# Patient Record
Sex: FEMALE | Race: OTHER | NOT HISPANIC OR LATINO
[De-identification: names, ages, dates, MRNs, and addresses within clinical notes are randomized per-mention and may not be internally consistent; named-entity substitution may affect disease eponyms.]

---

## 2017-02-02 ENCOUNTER — RESULT REVIEW (OUTPATIENT)
Age: 62
End: 2017-02-02

## 2019-03-21 ENCOUNTER — EMERGENCY (EMERGENCY)
Facility: HOSPITAL | Age: 64
LOS: 0 days | Discharge: HOME | End: 2019-03-21
Attending: EMERGENCY MEDICINE
Payer: COMMERCIAL

## 2019-03-21 VITALS
DIASTOLIC BLOOD PRESSURE: 84 MMHG | HEART RATE: 83 BPM | OXYGEN SATURATION: 100 % | RESPIRATION RATE: 17 BRPM | TEMPERATURE: 97 F | SYSTOLIC BLOOD PRESSURE: 145 MMHG | HEIGHT: 62 IN | WEIGHT: 121.03 LBS

## 2019-03-21 VITALS — DIASTOLIC BLOOD PRESSURE: 76 MMHG | SYSTOLIC BLOOD PRESSURE: 133 MMHG | TEMPERATURE: 98 F

## 2019-03-21 LAB
ALBUMIN SERPL ELPH-MCNC: 3.9 G/DL — SIGNIFICANT CHANGE UP (ref 3.3–5)
ALP SERPL-CCNC: 65 U/L — SIGNIFICANT CHANGE UP (ref 40–120)
ALT FLD-CCNC: 15 U/L — SIGNIFICANT CHANGE UP (ref 12–78)
ANION GAP SERPL CALC-SCNC: 5 MMOL/L — SIGNIFICANT CHANGE UP (ref 5–17)
AST SERPL-CCNC: 27 U/L — SIGNIFICANT CHANGE UP (ref 15–37)
BASOPHILS # BLD AUTO: 0 K/UL — SIGNIFICANT CHANGE UP (ref 0–0.2)
BASOPHILS NFR BLD AUTO: 0 % — SIGNIFICANT CHANGE UP (ref 0–2)
BILIRUB SERPL-MCNC: 0.5 MG/DL — SIGNIFICANT CHANGE UP (ref 0.2–1.2)
BUN SERPL-MCNC: 9 MG/DL — SIGNIFICANT CHANGE UP (ref 7–23)
CALCIUM SERPL-MCNC: 8.5 MG/DL — SIGNIFICANT CHANGE UP (ref 8.5–10.1)
CHLORIDE SERPL-SCNC: 101 MMOL/L — SIGNIFICANT CHANGE UP (ref 96–108)
CO2 SERPL-SCNC: 28 MMOL/L — SIGNIFICANT CHANGE UP (ref 22–31)
CREAT SERPL-MCNC: 0.61 MG/DL — SIGNIFICANT CHANGE UP (ref 0.5–1.3)
EOSINOPHIL # BLD AUTO: 0 K/UL — SIGNIFICANT CHANGE UP (ref 0–0.5)
EOSINOPHIL NFR BLD AUTO: 0 % — SIGNIFICANT CHANGE UP (ref 0–6)
GLUCOSE SERPL-MCNC: 105 MG/DL — HIGH (ref 70–99)
HCT VFR BLD CALC: 38.8 % — SIGNIFICANT CHANGE UP (ref 34.5–45)
HGB BLD-MCNC: 13.1 G/DL — SIGNIFICANT CHANGE UP (ref 11.5–15.5)
LYMPHOCYTES # BLD AUTO: 0.89 K/UL — LOW (ref 1–3.3)
LYMPHOCYTES # BLD AUTO: 26 % — SIGNIFICANT CHANGE UP (ref 13–44)
MAGNESIUM SERPL-MCNC: 2.3 MG/DL — SIGNIFICANT CHANGE UP (ref 1.6–2.6)
MANUAL SMEAR VERIFICATION: SIGNIFICANT CHANGE UP
MCHC RBC-ENTMCNC: 29 PG — SIGNIFICANT CHANGE UP (ref 27–34)
MCHC RBC-ENTMCNC: 33.8 GM/DL — SIGNIFICANT CHANGE UP (ref 32–36)
MCV RBC AUTO: 85.8 FL — SIGNIFICANT CHANGE UP (ref 80–100)
MONOCYTES # BLD AUTO: 0.27 K/UL — SIGNIFICANT CHANGE UP (ref 0–0.9)
MONOCYTES NFR BLD AUTO: 8 % — SIGNIFICANT CHANGE UP (ref 2–14)
NEUTROPHILS # BLD AUTO: 2.26 K/UL — SIGNIFICANT CHANGE UP (ref 1.8–7.4)
NEUTROPHILS NFR BLD AUTO: 66 % — SIGNIFICANT CHANGE UP (ref 43–77)
NRBC # BLD: 0 /100 — SIGNIFICANT CHANGE UP (ref 0–0)
NRBC # BLD: SIGNIFICANT CHANGE UP /100 WBCS (ref 0–0)
PLAT MORPH BLD: NORMAL — SIGNIFICANT CHANGE UP
PLATELET # BLD AUTO: 208 K/UL — SIGNIFICANT CHANGE UP (ref 150–400)
POTASSIUM SERPL-MCNC: 4.3 MMOL/L — SIGNIFICANT CHANGE UP (ref 3.5–5.3)
POTASSIUM SERPL-SCNC: 4.3 MMOL/L — SIGNIFICANT CHANGE UP (ref 3.5–5.3)
PROT SERPL-MCNC: 7.2 GM/DL — SIGNIFICANT CHANGE UP (ref 6–8.3)
RBC # BLD: 4.52 M/UL — SIGNIFICANT CHANGE UP (ref 3.8–5.2)
RBC # FLD: 13.3 % — SIGNIFICANT CHANGE UP (ref 10.3–14.5)
RBC BLD AUTO: NORMAL — SIGNIFICANT CHANGE UP
SODIUM SERPL-SCNC: 134 MMOL/L — LOW (ref 135–145)
WBC # BLD: 3.42 K/UL — LOW (ref 3.8–10.5)
WBC # FLD AUTO: 3.42 K/UL — LOW (ref 3.8–10.5)

## 2019-03-21 PROCEDURE — 99284 EMERGENCY DEPT VISIT MOD MDM: CPT | Mod: 25

## 2019-03-21 PROCEDURE — 93010 ELECTROCARDIOGRAM REPORT: CPT

## 2019-03-21 NOTE — ED PROVIDER NOTE - OBJECTIVE STATEMENT
Pt is a 64 yo lady with no significant past medical history who presents to the ED with abnormal labs. Went and had routine blood work done Tuesday and was told sodium and chloride were low. No n/v/d, no symptoms, is feeling well now.

## 2019-03-21 NOTE — ED ADULT NURSE NOTE - OBJECTIVE STATEMENT
63 y female presenting to the ER from 's office stating she has abnormal lab values. Pt denies pain, dizziness, N/V/D or weakness. Able to complete full sentences without any discomfort. No s/s of acute distress noted at this time. Will continue to monitor and provide care as needed.

## 2019-03-21 NOTE — ED ADULT NURSE NOTE - NSIMPLEMENTINTERV_GEN_ALL_ED
Implemented All Universal Safety Interventions:  [place] to call system. Call bell, personal items and telephone within reach. Instruct patient to call for assistance. Room bathroom lighting operational. Non-slip footwear when patient is off stretcher. Physically safe environment: no spills, clutter or unnecessary equipment. Stretcher in lowest position, wheels locked, appropriate side rails in place.

## 2019-03-21 NOTE — ED ADULT NURSE NOTE - CCCP TRG CHIEF CMPLNT
SUBJECTIVE:  This 54 year old female presents to the office for follow-up evaluation. Pressure at home was 130/99. It is generally 130s over 90s. Today it was 126/80. He tolerates Cozaar 50 mg 2 daily.  Recently her  had some medical problems but he is doing better.    REVIEW OF SYSTEMS:  Constitutional:  Denies fever or chills   Eyes:  Denies change in visual acuity   HENT:  Denies nasal congestion or sore throat   Respiratory:  Denies cough or shortness of breath   Cardiovascular:  Denies chest pain or edema   Gastrointestinal:  Denies abdominal pain, nausea, vomiting, bloody stools or diarrhea   Genitourinary:  She continues with her vaginal pain and urinary continence issues.  Musculoskeletal:  Denies back pain or joint pain   Integument:  Denies rash   Neurologic:  Denies headache, focal weakness or sensory changes   Endocrine:  Denies polyuria or polydipsia   Lymphatic:  Denies swollen glands   Psychiatric:  Denies depression or anxiety     OBJECTIVE:  Blood pressure 120/80, pulse 82, resp. rate 14, height 5' 5\" (1.651 m), weight 69.9 kg. Body mass index is 25.63 kg/(m^2).  GENERAL: In no acute distress.  HEENT: Tm's pearly grey. Eyes clear. Oral cavity clear. Neck supple without lymphadenopathy or thyromegaly.  LUNGS: Clear.  HEART: Regular   ABDOMEN: Soft, nontender.  No masses or hepatosplenomegaly.  Bowel sounds active.   EXTREMITIES: Without cyanosis, clubbing or edema.  PULSES: Intact throughout without bruits in the neck abdomen or groin.  NEUROLOGIC: No focal deficits. DTR's symmetric.    ASSESSMENT:  1. Essential hypertension        PLAN:  Orders Placed This Encounter   • losartan (COZAAR) 50 MG tablet          abnormal lab result

## 2019-03-22 DIAGNOSIS — R79.9 ABNORMAL FINDING OF BLOOD CHEMISTRY, UNSPECIFIED: ICD-10-CM

## 2019-07-30 ENCOUNTER — RESULT REVIEW (OUTPATIENT)
Age: 64
End: 2019-07-30

## 2020-07-13 ENCOUNTER — RESULT REVIEW (OUTPATIENT)
Age: 65
End: 2020-07-13

## 2020-07-31 ENCOUNTER — APPOINTMENT (OUTPATIENT)
Dept: UROGYNECOLOGY | Facility: CLINIC | Age: 65
End: 2020-07-31
Payer: COMMERCIAL

## 2020-07-31 VITALS
BODY MASS INDEX: 20.98 KG/M2 | HEIGHT: 62 IN | SYSTOLIC BLOOD PRESSURE: 142 MMHG | TEMPERATURE: 94.6 F | WEIGHT: 114 LBS | DIASTOLIC BLOOD PRESSURE: 90 MMHG

## 2020-07-31 DIAGNOSIS — Z82.49 FAMILY HISTORY OF ISCHEMIC HEART DISEASE AND OTHER DISEASES OF THE CIRCULATORY SYSTEM: ICD-10-CM

## 2020-07-31 DIAGNOSIS — Z78.9 OTHER SPECIFIED HEALTH STATUS: ICD-10-CM

## 2020-07-31 DIAGNOSIS — Z83.3 FAMILY HISTORY OF DIABETES MELLITUS: ICD-10-CM

## 2020-07-31 DIAGNOSIS — Z86.39 PERSONAL HISTORY OF OTHER ENDOCRINE, NUTRITIONAL AND METABOLIC DISEASE: ICD-10-CM

## 2020-07-31 DIAGNOSIS — Z83.42 FAMILY HISTORY OF FAMILIAL HYPERCHOLESTEROLEMIA: ICD-10-CM

## 2020-07-31 LAB
BILIRUB UR QL STRIP: NORMAL
CLARITY UR: CLEAR
COLLECTION METHOD: NORMAL
GLUCOSE UR-MCNC: NORMAL
HCG UR QL: 0.2 EU/DL
HGB UR QL STRIP.AUTO: NORMAL
KETONES UR-MCNC: NORMAL
LEUKOCYTE ESTERASE UR QL STRIP: NORMAL
NITRITE UR QL STRIP: NORMAL
PH UR STRIP: 6
PROT UR STRIP-MCNC: NORMAL
SP GR UR STRIP: 1.01

## 2020-07-31 PROCEDURE — 81003 URINALYSIS AUTO W/O SCOPE: CPT | Mod: QW

## 2020-07-31 PROCEDURE — 99204 OFFICE O/P NEW MOD 45 MIN: CPT | Mod: 25

## 2020-07-31 PROCEDURE — 51701 INSERT BLADDER CATHETER: CPT

## 2020-07-31 RX ORDER — MELATONIN 3 MG
TABLET ORAL
Refills: 0 | Status: ACTIVE | COMMUNITY

## 2020-07-31 NOTE — HISTORY OF PRESENT ILLNESS
[Cystocele (Obstetric)] : mild [Uterine Prolapse] : moderate [Vaginal Wall Prolapse] : no [Rectal Prolapse] : no [Feelings Of Urinary Urgency] : no [Unable To Restrain Bowel Movement] : no [x1] : nocturia once nightly [Urinary Tract Infection] : no [Constipation Obstructed Defecation] : no [Vaginal Pain] : no [Urinary Frequency] : moderate [de-identified] : occassional, for the last 1-2 years  [] : no [FreeTextEntry5] : for the last 1-2 years  [FreeTextEntry3] : when taking bath for the past 1-2 years  [de-identified] : for the last 4-5 months  [FreeTextEntry1] : Arabella is a 65 yo para 2 who presents with pelvic organ prolapse for the last 1-2 years. She was fitted with a pessary 1x in the past (about 2 years ago) and it fell out at that visit and no further trial of different pessary was done. \par \par PMHx: HLD, pre-diabetic (not on medications)\par PSHx: cataracts, tubal ligation \par SHx: no tobacco, alcohol, or drug use.

## 2020-07-31 NOTE — PHYSICAL EXAM
[Chaperone Present] : A chaperone was present in the examining room during all aspects of the physical examination [Well developed] : well developed [No Acute Distress] : in no acute distress [Oriented x3] : oriented to person, place, and time [Labia Majora] : were normal [Labia Minora] : were normal [Normal Appearance] : general appearance was normal [Atrophy] : atrophy [Ba ____] : Ba [unfilled] [Aa ____] : Aa [unfilled] [GH ____] : GH [unfilled] [C ____] : C [unfilled] [PB ____] : PB [unfilled] [TVL ____] : TVL  [unfilled] [Ap ____] : Ap [unfilled] [Bp ____] : Bp [unfilled] [D ____] : D [unfilled] [Normal] : was normal [Normal rectal exam] : was normal [Respirations regular] : ~T respiratory rate was regular [No Edema] : ~T edema was not present [Mass (___ Cm)] : no ~M [unfilled] abdominal mass was palpated [Tenderness] : ~T no ~M abdominal tenderness observed [Inguinal LAD] : no adenopathy was noted in the inguinal lymph nodes [Distended] : not distended [Warm and Dry] : was warm and dry to touch [Normal Gait] : gait was normal [Vulvar Atrophy] : vulvar atrophy [Rectocele] : a rectocele [Cystocele] : a cystocele [Uterine Prolapse] : uterine prolapse [Uterine Adnexae] : were not tender and not enlarged [Post Void Residual ____ml] : post void residual was [unfilled] ml [FreeTextEntry3] : urethral hypermobility noted during pop-q assessment  [de-identified] : Stage II pelvic organ prolapse

## 2020-07-31 NOTE — DISCUSSION/SUMMARY
[FreeTextEntry1] : 1. Stage II Pelvic Organ Prolapse \par I reviewed the above findings with the patient with visual illustrations. Treatment options for the prolapse were discussed and included doing nothing, Kegel exercises and behavioral modification, a pessary, or surgical correction.Surgically we discussed the abdominal vs the vaginal routes. Abdominally we discussed a hysterectomy and a sacral colpopexy   laparoscopically and robotically.  Vaginally we discussed a vaginal hysterectomy, uterosacral suspension, and anterior/posterior repair. Surgically we discussed hysteropexy as well as the use of biologics.  She is interest in surgical correction and We discussed returning for urodynamic testing to further evaluate her urinary symptoms and to go for a pelvic ultrasound.  IUGA forms on pelvic organ prolapse. Patient to follow up for further discussion of treatment options after urodynamic testing, and pelvic ultrasound. All questions were answered.\par \par Udip today with trace blood, will send UA/UCx \par

## 2020-07-31 NOTE — REASON FOR VISIT
[Questionnaire Received] : Patient questionnaire received [Intake Form Reviewed] : Patient intake form with past medical history, surgical history, family history and social history reviewed today [Initial Visit ___] : an initial visit for [unfilled] [Pelvic Organ Prolapse] : pelvic organ prolapse

## 2020-08-01 ENCOUNTER — RECORD ABSTRACTING (OUTPATIENT)
Age: 65
End: 2020-08-01

## 2020-08-03 LAB
APPEARANCE: CLEAR
BACTERIA: NEGATIVE
BILIRUBIN URINE: NEGATIVE
BLOOD URINE: NEGATIVE
COLOR: COLORLESS
GLUCOSE QUALITATIVE U: NEGATIVE
HYALINE CASTS: 0 /LPF
KETONES URINE: NEGATIVE
LEUKOCYTE ESTERASE URINE: NEGATIVE
MICROSCOPIC-UA: NORMAL
NITRITE URINE: NEGATIVE
PH URINE: 6.5
PROTEIN URINE: NEGATIVE
RED BLOOD CELLS URINE: 0 /HPF
SPECIFIC GRAVITY URINE: 1.01
SQUAMOUS EPITHELIAL CELLS: 0 /HPF
UROBILINOGEN URINE: NORMAL
WHITE BLOOD CELLS URINE: 0 /HPF

## 2020-08-10 ENCOUNTER — OUTPATIENT (OUTPATIENT)
Dept: OUTPATIENT SERVICES | Facility: HOSPITAL | Age: 65
LOS: 1 days | End: 2020-08-10
Payer: COMMERCIAL

## 2020-08-10 ENCOUNTER — APPOINTMENT (OUTPATIENT)
Dept: UROGYNECOLOGY | Facility: CLINIC | Age: 65
End: 2020-08-10
Payer: COMMERCIAL

## 2020-08-10 DIAGNOSIS — Z01.818 ENCOUNTER FOR OTHER PREPROCEDURAL EXAMINATION: ICD-10-CM

## 2020-08-10 PROCEDURE — 51729 CYSTOMETROGRAM W/VP&UP: CPT

## 2020-08-10 PROCEDURE — 51797 INTRAABDOMINAL PRESSURE TEST: CPT | Mod: 26

## 2020-08-10 PROCEDURE — 51797 INTRAABDOMINAL PRESSURE TEST: CPT

## 2020-08-10 PROCEDURE — 51784 ANAL/URINARY MUSCLE STUDY: CPT | Mod: 26

## 2020-08-10 PROCEDURE — 51784 ANAL/URINARY MUSCLE STUDY: CPT

## 2020-08-10 PROCEDURE — 51729 CYSTOMETROGRAM W/VP&UP: CPT | Mod: 26

## 2020-08-17 ENCOUNTER — APPOINTMENT (OUTPATIENT)
Dept: UROGYNECOLOGY | Facility: CLINIC | Age: 65
End: 2020-08-17
Payer: COMMERCIAL

## 2020-08-17 PROCEDURE — 99214 OFFICE O/P EST MOD 30 MIN: CPT

## 2020-08-17 NOTE — DISCUSSION/SUMMARY
[FreeTextEntry1] :  I reviewed the above findings with the patient with visual illustrations. Treatment options for the prolapse were discussed and included doing nothing, Kegel exercises and behavioral modification, a pessary, or surgical correction. Surgically we discussed the abdominal vs the vaginal routes. Abdominally we discussed a hysterectomy and a sacral colpopexy laparoscopically and robotically. Vaginally we discussed a vaginal hysterectomy, uterosacral suspension, and anterior/posterior repair. Surgically we discussed hysteropexy as well as the use of biologics. She is interested in surgical correction via vaginal hysterectomy, uterosacral suspension and anterior/posterior repair. For her occult stress incontinence We discussed surgical and nonsurgical treatment options, and she wishes to proceed with surgical correction with a mid urethral sling.We discussed the possibility of going home with a catheter and failure. IUGA patient information was given to the patient on vaginal hysterectomy, uterosacral suspension, and anterior/posterior repair, and midurethral sling. All questions were answered, Script for pelvic ultrasound was given to the patient.

## 2020-08-17 NOTE — PHYSICAL EXAM
[No Acute Distress] : in no acute distress [Well developed] : well developed [Oriented x3] : oriented to person, place, and time [Normal Appearance] : ~T the appearance of the neck was normal [Warm and Dry] : was warm and dry to touch [Normal Gait] : gait was normal [Labia Minora] : were normal [Cystocele] : a cystocele [Uterine Prolapse] : uterine prolapse [Rectocele] : a rectocele [Post Void Residual ____ml] : post void residual was [unfilled] ml [Normal] : was normal [Mass (___ Cm)] : no ~M [unfilled] abdominal mass was palpated [Tenderness] : ~T no ~M abdominal tenderness observed [Distended] : not distended [Inguinal LAD] : no adenopathy was noted in the inguinal lymph nodes

## 2020-08-17 NOTE — HISTORY OF PRESENT ILLNESS
[] : no [FreeTextEntry1] : Arabella is a 63 yo para 2 who presents with pelvic organ prolapse for the last 1-2 years. She was fitted with a pessary 1x in the past (about 2 years ago) and it fell out at that visit and no further trial of different pessary was done. Counseled on surgical intervention in 07/2020 and reports no change in symptoms since. \par \par UDS 8/2020: stress incontinence \par 7/31/30: UA and culture- negative \par Did not do pelvic ultrasound. \par \par PMHx: HLD, pre-diabetic (not on medications) \par PSHx: cataracts, tubal ligation \par SHx: no tobacco, alcohol, or drug use.

## 2020-08-27 ENCOUNTER — OUTPATIENT (OUTPATIENT)
Dept: OUTPATIENT SERVICES | Facility: HOSPITAL | Age: 65
LOS: 1 days | End: 2020-08-27
Payer: COMMERCIAL

## 2020-08-27 ENCOUNTER — APPOINTMENT (OUTPATIENT)
Dept: ULTRASOUND IMAGING | Facility: CLINIC | Age: 65
End: 2020-08-27
Payer: COMMERCIAL

## 2020-08-27 DIAGNOSIS — Z00.8 ENCOUNTER FOR OTHER GENERAL EXAMINATION: ICD-10-CM

## 2020-08-27 PROCEDURE — 76830 TRANSVAGINAL US NON-OB: CPT

## 2020-08-27 PROCEDURE — 76830 TRANSVAGINAL US NON-OB: CPT | Mod: 26

## 2020-09-01 ENCOUNTER — APPOINTMENT (OUTPATIENT)
Dept: UROGYNECOLOGY | Facility: CLINIC | Age: 65
End: 2020-09-01
Payer: COMMERCIAL

## 2020-09-01 LAB — BACTERIA UR CULT: NORMAL

## 2020-09-01 PROCEDURE — 99213 OFFICE O/P EST LOW 20 MIN: CPT

## 2020-09-01 NOTE — REASON FOR VISIT
[Follow-Up Visit_____] : a follow-up visit for [unfilled] [Questionnaire Received] : Patient questionnaire received [Intake Form Reviewed] : Patient intake form with past medical history, surgical history, family history and social history reviewed today [Pelvic Organ Prolapse] : pelvic organ prolapse

## 2020-09-01 NOTE — HISTORY OF PRESENT ILLNESS
[FreeTextEntry1] : Patient returns today for followup on her pelvic organ prolapse and occult stress urinary incontinence. Her chart was reviewed. She had a transvaginal ultrasound in August, which revealed the uterus 4.7 x 2.7 x 3.6 cm and an endometrial lining of 2 mm. On examination in July. She had a pop Q. stage II pelvic organ prolapse with uterovaginal prolapse, cystocele, and rectocele. She had urodynamic testing in August, which revealed stress urinary incontinence with a component of intrinsic sphincteric deficiency based on a leak point pressure below 60 cm of water at a volume of 400 mL.I reviewed these findings with the patient with visual illustrations. Treatment options for the prolapse were discussed and included doing nothing, Kegel exercises and behavioral modification, a pessary, or surgical correction. Surgically we discussed the abdominal vs the vaginal routes. Abdominally we discussed a hysterectomy and a sacral colpopexy laparoscopically and robotically. Vaginally we discussed a vaginal hysterectomy, uterosacral suspension, and anterior/posterior repair. Surgically we discussed hysteropexy as well as the use of biologics. She is interested in surgical correction via vaginal hysterectomy, uterosacral suspension and anterior/posterior repair. For her occult stress incontinence we discussed surgical and nonsurgical treatment options, and she wishes to proceed with surgical correction with a mid urethral sling.We discussed the possibility of going home with a catheter and failure. IUGA patient information was previously given to the patient on vaginal hysterectomy, uterosacral suspension, and anterior/posterior repair, and midurethral sling. All questions were answered and pt wishes to schedule surgery

## 2020-09-30 ENCOUNTER — APPOINTMENT (OUTPATIENT)
Dept: UROGYNECOLOGY | Facility: CLINIC | Age: 65
End: 2020-09-30
Payer: COMMERCIAL

## 2020-09-30 VITALS — TEMPERATURE: 98.7 F | SYSTOLIC BLOOD PRESSURE: 142 MMHG | DIASTOLIC BLOOD PRESSURE: 90 MMHG | HEART RATE: 68 BPM

## 2020-09-30 DIAGNOSIS — N36.42 INTRINSIC SPHINCTER DEFICIENCY (ISD): ICD-10-CM

## 2020-09-30 PROCEDURE — 99214 OFFICE O/P EST MOD 30 MIN: CPT

## 2020-09-30 NOTE — PHYSICAL EXAM
[Chaperone Present] : A chaperone was present in the examining room during all aspects of the physical examination [Well developed] : well developed [No Acute Distress] : in no acute distress [Oriented x3] : oriented to person, place, and time [Warm and Dry] : was warm and dry to touch [Normal Gait] : gait was normal [Vulvar Atrophy] : vulvar atrophy [Labia Majora] : were normal [Labia Minora] : were normal [Atrophy] : atrophy [Normal Appearance] : general appearance was normal [Rectocele] : a rectocele [Cystocele] : a cystocele [Uterine Prolapse] : uterine prolapse [Aa ____] : Aa [unfilled] [Ba ____] : Ba [unfilled] [C ____] : C [unfilled] [GH ____] : GH [unfilled] [PB ____] : PB [unfilled] [TVL ____] : TVL  [unfilled] [Ap ____] : Ap [unfilled] [Bp ____] : Bp [unfilled] [D ____] : D [unfilled] [Uterine Adnexae] : were not tender and not enlarged [Normal] : normal [Exam Deferred] : was deferred [Distended] : not distended [Tenderness] : ~T no ~M abdominal tenderness observed [de-identified] : Stage II pelvic organ prolapse  [de-identified] : Bladder catheterization done. 100mL
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
## 2020-09-30 NOTE — DISCUSSION/SUMMARY
[FreeTextEntry1] : 1. Stage II pelvic organ prolapse \par Patient is still interested in surgery and continues to desire vaginal approach to surgery from her surgical counseling done at the last visit. We reviewed the above findings as well a surgical and nonsurgical treatment options for the prolapse and urinary incontinence and she wishes to proceed with surgical correction. The prolapse she wishes to proceed with a vaginal hysterectomy uterosacral suspension and anterior/posterior/enterocele repair and for the stress urinary incontinence she desires to proceed with a mid urethral sling. Risks and benefits of a BSO were discussed and if we are able to do a bilateral salpingectomy she wishes to proceed with such. We discussed the possibility of laparoscopy and/or laparotomy at the time of surgical correction.We discussed the placement of a suburethral sling. Risks and benefits of a TOT sling versus a Retropubic sling vs mini sling were discussed and included but not limited to bladder and bowel perforation, voiding dysfunction, and groin pain. She wishes to proceed with a retropubic sling.Risks and benefits of the procedure were discussed and included but not limited to bleeding, infection, failure, erosion, dyspareunia, damage to other organs including but not limited to bladder, bowel, and ureters, developing chronic pelvic pain, and urinary retention. We discussed the possibility of going home with a catheter. Hospital stay, postoperative restrictions, and anesthesia were discussed. All questions were answered and she wishes to proceed with surgical correction. Consent was signed in the office.\par \par \par Patient consented for total vaginal hysterectomy, uterosacral ligament suspension, anterior/posterior/enterocele repair, retropubic midurethral sling, cystoscopy, pelvic exam under anesthesia, possible bilateral salpingectomy, possible bilateral oophorectomy, possible laparoscopy/laparotomy. Patient understands that pelvic exam under anesthesia could be performed by learners (medical students/residents/fellows). \par

## 2020-10-02 LAB — BACTERIA UR CULT: NORMAL

## 2020-10-09 ENCOUNTER — TRANSCRIPTION ENCOUNTER (OUTPATIENT)
Age: 65
End: 2020-10-09

## 2020-10-09 ENCOUNTER — OUTPATIENT (OUTPATIENT)
Dept: OUTPATIENT SERVICES | Facility: HOSPITAL | Age: 65
LOS: 1 days | End: 2020-10-09
Payer: COMMERCIAL

## 2020-10-09 VITALS
RESPIRATION RATE: 18 BRPM | OXYGEN SATURATION: 97 % | TEMPERATURE: 98 F | HEART RATE: 86 BPM | SYSTOLIC BLOOD PRESSURE: 129 MMHG | DIASTOLIC BLOOD PRESSURE: 83 MMHG | HEIGHT: 62 IN | WEIGHT: 115.08 LBS

## 2020-10-09 DIAGNOSIS — Z98.49 CATARACT EXTRACTION STATUS, UNSPECIFIED EYE: Chronic | ICD-10-CM

## 2020-10-09 DIAGNOSIS — Z29.9 ENCOUNTER FOR PROPHYLACTIC MEASURES, UNSPECIFIED: ICD-10-CM

## 2020-10-09 DIAGNOSIS — Z01.818 ENCOUNTER FOR OTHER PREPROCEDURAL EXAMINATION: ICD-10-CM

## 2020-10-09 DIAGNOSIS — Z98.51 TUBAL LIGATION STATUS: Chronic | ICD-10-CM

## 2020-10-09 DIAGNOSIS — N81.2 INCOMPLETE UTEROVAGINAL PROLAPSE: ICD-10-CM

## 2020-10-09 DIAGNOSIS — N81.6 RECTOCELE: ICD-10-CM

## 2020-10-09 DIAGNOSIS — N81.11 CYSTOCELE, MIDLINE: ICD-10-CM

## 2020-10-09 DIAGNOSIS — N81.4 UTEROVAGINAL PROLAPSE, UNSPECIFIED: ICD-10-CM

## 2020-10-09 LAB
A1C WITH ESTIMATED AVERAGE GLUCOSE RESULT: 5.8 % — HIGH (ref 4–5.6)
ANION GAP SERPL CALC-SCNC: 10 MMOL/L — SIGNIFICANT CHANGE UP (ref 5–17)
BLD GP AB SCN SERPL QL: NEGATIVE — SIGNIFICANT CHANGE UP
BUN SERPL-MCNC: 13 MG/DL — SIGNIFICANT CHANGE UP (ref 7–23)
CALCIUM SERPL-MCNC: 9.2 MG/DL — SIGNIFICANT CHANGE UP (ref 8.4–10.5)
CHLORIDE SERPL-SCNC: 96 MMOL/L — SIGNIFICANT CHANGE UP (ref 96–108)
CO2 SERPL-SCNC: 25 MMOL/L — SIGNIFICANT CHANGE UP (ref 22–31)
CREAT SERPL-MCNC: 0.57 MG/DL — SIGNIFICANT CHANGE UP (ref 0.5–1.3)
ESTIMATED AVERAGE GLUCOSE: 120 MG/DL — HIGH (ref 68–114)
GLUCOSE SERPL-MCNC: 87 MG/DL — SIGNIFICANT CHANGE UP (ref 70–99)
HCT VFR BLD CALC: 38 % — SIGNIFICANT CHANGE UP (ref 34.5–45)
HCV AB S/CO SERPL IA: 0.05 S/CO — SIGNIFICANT CHANGE UP (ref 0–0.99)
HCV AB SERPL-IMP: SIGNIFICANT CHANGE UP
HGB BLD-MCNC: 12.8 G/DL — SIGNIFICANT CHANGE UP (ref 11.5–15.5)
HIV 1+2 AB+HIV1 P24 AG SERPL QL IA: SIGNIFICANT CHANGE UP
MCHC RBC-ENTMCNC: 29 PG — SIGNIFICANT CHANGE UP (ref 27–34)
MCHC RBC-ENTMCNC: 33.7 GM/DL — SIGNIFICANT CHANGE UP (ref 32–36)
MCV RBC AUTO: 86.2 FL — SIGNIFICANT CHANGE UP (ref 80–100)
NRBC # BLD: 0 /100 WBCS — SIGNIFICANT CHANGE UP (ref 0–0)
PLATELET # BLD AUTO: 205 K/UL — SIGNIFICANT CHANGE UP (ref 150–400)
POTASSIUM SERPL-MCNC: 4.1 MMOL/L — SIGNIFICANT CHANGE UP (ref 3.5–5.3)
POTASSIUM SERPL-SCNC: 4.1 MMOL/L — SIGNIFICANT CHANGE UP (ref 3.5–5.3)
RBC # BLD: 4.41 M/UL — SIGNIFICANT CHANGE UP (ref 3.8–5.2)
RBC # FLD: 13 % — SIGNIFICANT CHANGE UP (ref 10.3–14.5)
RH IG SCN BLD-IMP: POSITIVE — SIGNIFICANT CHANGE UP
SODIUM SERPL-SCNC: 131 MMOL/L — LOW (ref 135–145)
WBC # BLD: 3.28 K/UL — LOW (ref 3.8–10.5)
WBC # FLD AUTO: 3.28 K/UL — LOW (ref 3.8–10.5)

## 2020-10-09 PROCEDURE — 83036 HEMOGLOBIN GLYCOSYLATED A1C: CPT

## 2020-10-09 PROCEDURE — 86901 BLOOD TYPING SEROLOGIC RH(D): CPT

## 2020-10-09 PROCEDURE — 85027 COMPLETE CBC AUTOMATED: CPT

## 2020-10-09 PROCEDURE — 86803 HEPATITIS C AB TEST: CPT

## 2020-10-09 PROCEDURE — 86900 BLOOD TYPING SEROLOGIC ABO: CPT

## 2020-10-09 PROCEDURE — 86850 RBC ANTIBODY SCREEN: CPT

## 2020-10-09 PROCEDURE — G0463: CPT

## 2020-10-09 PROCEDURE — 87389 HIV-1 AG W/HIV-1&-2 AB AG IA: CPT

## 2020-10-09 PROCEDURE — 80048 BASIC METABOLIC PNL TOTAL CA: CPT

## 2020-10-09 RX ORDER — CEFOTETAN DISODIUM 1 G
2 VIAL (EA) INJECTION ONCE
Refills: 0 | Status: DISCONTINUED | OUTPATIENT
Start: 2020-10-22 | End: 2020-10-22

## 2020-10-09 RX ORDER — CHLORHEXIDINE GLUCONATE 213 G/1000ML
1 SOLUTION TOPICAL ONCE
Refills: 0 | Status: DISCONTINUED | OUTPATIENT
Start: 2020-10-22 | End: 2020-10-22

## 2020-10-09 NOTE — H&P PST ADULT - HISTORY OF PRESENT ILLNESS
64 year old female reports a known history of utero-vaginal prolapse and states she has recently began feeling a "bulging sensation" in her perineal area. Presents for midurethral sling/anterior posterior repair/total vaginal hysterectomy with uterosacral suspension.   Pt. has COVID swab testing scheduled for 10/19/20 at the [facility] location.

## 2020-10-09 NOTE — H&P PST ADULT - NSICDXPROBLEM_GEN_ALL_CORE_FT
PROBLEM DIAGNOSES  Problem: Uterovaginal prolapse  Assessment and Plan: Midurethral Sling  Cystoscopy  Anteior Posterior Repair with Enterocele  Total Vaginal Hysterectomy  Uterosacral Suspension    Problem: Need for prophylactic measure  Assessment and Plan: The Caprini score indicates this patient is at risk for a VTE event (score 3-5).  Most surgical patients in this group would benefit from pharmacologic prophylaxis.  The surgical team will determine the balance between VTE risk and bleeding risk
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
## 2020-10-09 NOTE — H&P PST ADULT - ASSESSMENT
CAPRINI SCORE [CLOT]    AGE RELATED RISK FACTORS                                                       MOBILITY RELATED FACTORS  [ ] Age 41-60 years                                            (1 Point)                  [ ] Bed rest                                                        (1 Point)  [X ] Age: 61-74 years                                           (2 Points)                 [ ] Plaster cast                                                   (2 Points)  [ ] Age= 75 years                                              (3 Points)                 [ ] Bed bound for more than 72 hours                 (2 Points)    DISEASE RELATED RISK FACTORS                                               GENDER SPECIFIC FACTORS  [ ] Edema in the lower extremities                       (1 Point)                  [ ] Pregnancy                                                     (1 Point)  [ ] Varicose veins                                               (1 Point)                  [ ] Post-partum < 6 weeks                                   (1 Point)             [ ] BMI > 25 Kg/m2                                            (1 Point)                  [ ] Hormonal therapy  or oral contraception          (1 Point)                 [ ] Sepsis (in the previous month)                        (1 Point)                  [ ] History of pregnancy complications                 (1 point)  [ ] Pneumonia or serious lung disease                                               [ ] Unexplained or recurrent                     (1 Point)           (in the previous month)                               (1 Point)  [ ] Abnormal pulmonary function test                     (1 Point)                 SURGERY RELATED RISK FACTORS  [ ] Acute myocardial infarction                              (1 Point)                 [ ]  Section                                             (1 Point)  [ ] Congestive heart failure (in the previous month)  (1 Point)               [ ] Minor surgery                                                  (1 Point)   [ ] Inflammatory bowel disease                             (1 Point)                 [ ] Arthroscopic surgery                                        (2 Points)  [ ] Central venous access                                      (2 Points)                [X ] General surgery lasting more than 45 minutes   (2 Points)       [ ] Stroke (in the previous month)                          (5 Points)               [ ] Elective arthroplasty                                         (5 Points)                                                                                                                                               HEMATOLOGY RELATED FACTORS                                                 TRAUMA RELATED RISK FACTORS  [ ] Prior episodes of VTE                                     (3 Points)                 [ ] Fracture of the hip, pelvis, or leg                       (5 Points)  [ ] Positive family history for VTE                         (3 Points)                 [ ] Acute spinal cord injury (in the previous month)  (5 Points)  [ ] Prothrombin 14098 A                                     (3 Points)                 [ ] Paralysis  (less than 1 month)                             (5 Points)  [ ] Factor V Leiden                                             (3 Points)                  [ ] Multiple Trauma within 1 month                        (5 Points)  [ ] Lupus anticoagulants                                     (3 Points)                                                           [ ] Anticardiolipin antibodies                               (3 Points)                                                       [ ] High homocysteine in the blood                      (3 Points)                                             [ ] Other congenital or acquired thrombophilia      (3 Points)                                                [ ] Heparin induced thrombocytopenia                  (3 Points)                                          Total Score [      4    ]

## 2020-10-09 NOTE — H&P PST ADULT - NSANTHOSAYNRD_GEN_A_CORE
No. [place] screening performed.  STOP BANG Legend: 0-2 = LOW Risk; 3-4 = INTERMEDIATE Risk; 5-8 = HIGH Risk

## 2020-10-19 ENCOUNTER — APPOINTMENT (OUTPATIENT)
Dept: DISASTER EMERGENCY | Facility: CLINIC | Age: 65
End: 2020-10-19

## 2020-10-19 LAB — SARS-COV-2 N GENE NPH QL NAA+PROBE: NOT DETECTED

## 2020-10-21 ENCOUNTER — TRANSCRIPTION ENCOUNTER (OUTPATIENT)
Age: 65
End: 2020-10-21

## 2020-10-22 ENCOUNTER — INPATIENT (INPATIENT)
Facility: HOSPITAL | Age: 65
LOS: 0 days | Discharge: HOME | DRG: 743 | End: 2020-10-23
Attending: UROLOGY | Admitting: UROLOGY
Payer: COMMERCIAL

## 2020-10-22 ENCOUNTER — RESULT REVIEW (OUTPATIENT)
Age: 65
End: 2020-10-22

## 2020-10-22 ENCOUNTER — APPOINTMENT (OUTPATIENT)
Dept: UROGYNECOLOGY | Facility: HOSPITAL | Age: 65
End: 2020-10-22

## 2020-10-22 VITALS
SYSTOLIC BLOOD PRESSURE: 151 MMHG | HEART RATE: 76 BPM | HEIGHT: 62 IN | RESPIRATION RATE: 18 BRPM | TEMPERATURE: 98 F | DIASTOLIC BLOOD PRESSURE: 86 MMHG | WEIGHT: 115.08 LBS | OXYGEN SATURATION: 100 %

## 2020-10-22 DIAGNOSIS — Z98.51 TUBAL LIGATION STATUS: Chronic | ICD-10-CM

## 2020-10-22 DIAGNOSIS — Z98.49 CATARACT EXTRACTION STATUS, UNSPECIFIED EYE: Chronic | ICD-10-CM

## 2020-10-22 DIAGNOSIS — N81.2 INCOMPLETE UTEROVAGINAL PROLAPSE: ICD-10-CM

## 2020-10-22 DIAGNOSIS — N81.11 CYSTOCELE, MIDLINE: ICD-10-CM

## 2020-10-22 LAB
GLUCOSE BLDC GLUCOMTR-MCNC: 82 MG/DL — SIGNIFICANT CHANGE UP (ref 70–99)
RH IG SCN BLD-IMP: POSITIVE — SIGNIFICANT CHANGE UP

## 2020-10-22 PROCEDURE — 88307 TISSUE EXAM BY PATHOLOGIST: CPT | Mod: 26

## 2020-10-22 PROCEDURE — 57288 REPAIR BLADDER DEFECT: CPT

## 2020-10-22 PROCEDURE — 57250 REPAIR RECTUM & VAGINA: CPT

## 2020-10-22 PROCEDURE — 58260 VAGINAL HYSTERECTOMY: CPT

## 2020-10-22 PROCEDURE — 88342 IMHCHEM/IMCYTCHM 1ST ANTB: CPT | Mod: 26

## 2020-10-22 PROCEDURE — 57283 COLPOPEXY INTRAPERITONEAL: CPT | Mod: 59

## 2020-10-22 RX ORDER — ACETAMINOPHEN 500 MG
975 TABLET ORAL EVERY 6 HOURS
Refills: 0 | Status: DISCONTINUED | OUTPATIENT
Start: 2020-10-22 | End: 2020-10-23

## 2020-10-22 RX ORDER — ONDANSETRON 8 MG/1
4 TABLET, FILM COATED ORAL ONCE
Refills: 0 | Status: DISCONTINUED | OUTPATIENT
Start: 2020-10-22 | End: 2020-10-22

## 2020-10-22 RX ORDER — SIMETHICONE 80 MG/1
80 TABLET, CHEWABLE ORAL EVERY 6 HOURS
Refills: 0 | Status: DISCONTINUED | OUTPATIENT
Start: 2020-10-22 | End: 2020-10-23

## 2020-10-22 RX ORDER — CELECOXIB 200 MG/1
200 CAPSULE ORAL ONCE
Refills: 0 | Status: COMPLETED | OUTPATIENT
Start: 2020-10-22 | End: 2020-10-22

## 2020-10-22 RX ORDER — ONDANSETRON 8 MG/1
4 TABLET, FILM COATED ORAL EVERY 8 HOURS
Refills: 0 | Status: DISCONTINUED | OUTPATIENT
Start: 2020-10-22 | End: 2020-10-23

## 2020-10-22 RX ORDER — CHOLECALCIFEROL (VITAMIN D3) 125 MCG
1 CAPSULE ORAL
Qty: 0 | Refills: 0 | DISCHARGE

## 2020-10-22 RX ORDER — OXYCODONE HYDROCHLORIDE 5 MG/1
5 TABLET ORAL EVERY 4 HOURS
Refills: 0 | Status: DISCONTINUED | OUTPATIENT
Start: 2020-10-22 | End: 2020-10-23

## 2020-10-22 RX ORDER — SODIUM CHLORIDE 9 MG/ML
3 INJECTION INTRAMUSCULAR; INTRAVENOUS; SUBCUTANEOUS EVERY 8 HOURS
Refills: 0 | Status: DISCONTINUED | OUTPATIENT
Start: 2020-10-22 | End: 2020-10-22

## 2020-10-22 RX ORDER — HYDROMORPHONE HYDROCHLORIDE 2 MG/ML
0.2 INJECTION INTRAMUSCULAR; INTRAVENOUS; SUBCUTANEOUS
Refills: 0 | Status: DISCONTINUED | OUTPATIENT
Start: 2020-10-22 | End: 2020-10-22

## 2020-10-22 RX ORDER — IBUPROFEN 200 MG
600 TABLET ORAL EVERY 6 HOURS
Refills: 0 | Status: DISCONTINUED | OUTPATIENT
Start: 2020-10-22 | End: 2020-10-23

## 2020-10-22 RX ORDER — ACETAMINOPHEN 500 MG
975 TABLET ORAL ONCE
Refills: 0 | Status: COMPLETED | OUTPATIENT
Start: 2020-10-22 | End: 2020-10-22

## 2020-10-22 RX ORDER — SODIUM CHLORIDE 9 MG/ML
1000 INJECTION, SOLUTION INTRAVENOUS
Refills: 0 | Status: DISCONTINUED | OUTPATIENT
Start: 2020-10-22 | End: 2020-10-22

## 2020-10-22 RX ORDER — LIDOCAINE HCL 20 MG/ML
0.2 VIAL (ML) INJECTION ONCE
Refills: 0 | Status: COMPLETED | OUTPATIENT
Start: 2020-10-22 | End: 2020-10-22

## 2020-10-22 RX ORDER — SODIUM CHLORIDE 9 MG/ML
1000 INJECTION, SOLUTION INTRAVENOUS
Refills: 0 | Status: DISCONTINUED | OUTPATIENT
Start: 2020-10-22 | End: 2020-10-23

## 2020-10-22 RX ORDER — FAMOTIDINE 10 MG/ML
20 INJECTION INTRAVENOUS ONCE
Refills: 0 | Status: COMPLETED | OUTPATIENT
Start: 2020-10-22 | End: 2020-10-22

## 2020-10-22 RX ADMIN — ONDANSETRON 4 MILLIGRAM(S): 8 TABLET, FILM COATED ORAL at 20:28

## 2020-10-22 RX ADMIN — HYDROMORPHONE HYDROCHLORIDE 0.2 MILLIGRAM(S): 2 INJECTION INTRAMUSCULAR; INTRAVENOUS; SUBCUTANEOUS at 16:00

## 2020-10-22 RX ADMIN — HYDROMORPHONE HYDROCHLORIDE 0.2 MILLIGRAM(S): 2 INJECTION INTRAMUSCULAR; INTRAVENOUS; SUBCUTANEOUS at 14:45

## 2020-10-22 RX ADMIN — CELECOXIB 200 MILLIGRAM(S): 200 CAPSULE ORAL at 09:45

## 2020-10-22 RX ADMIN — HYDROMORPHONE HYDROCHLORIDE 0.2 MILLIGRAM(S): 2 INJECTION INTRAMUSCULAR; INTRAVENOUS; SUBCUTANEOUS at 14:37

## 2020-10-22 RX ADMIN — Medication 975 MILLIGRAM(S): at 23:20

## 2020-10-22 RX ADMIN — HYDROMORPHONE HYDROCHLORIDE 0.2 MILLIGRAM(S): 2 INJECTION INTRAMUSCULAR; INTRAVENOUS; SUBCUTANEOUS at 15:00

## 2020-10-22 RX ADMIN — Medication 600 MILLIGRAM(S): at 17:52

## 2020-10-22 RX ADMIN — FAMOTIDINE 20 MILLIGRAM(S): 10 INJECTION INTRAVENOUS at 09:45

## 2020-10-22 RX ADMIN — Medication 600 MILLIGRAM(S): at 19:26

## 2020-10-22 NOTE — PRE-ANESTHESIA EVALUATION ADULT - NSANTHGENDERRD_ENT_A_CORE
No

## 2020-10-22 NOTE — BRIEF OPERATIVE NOTE - NSICDXBRIEFPREOP_GEN_ALL_CORE_FT
PRE-OP DIAGNOSIS:  Prolapse of female pelvic organs 22-Oct-2020 15:22:22  Mitzi Weinberg   PRE-OP DIAGNOSIS:  Prolapse of female pelvic organs 22-Oct-2020 15:22:22  Sultana, Mitzi  Urinary, incontinence, stress female 22-Oct-2020 19:06:11  Cornelia Rosario

## 2020-10-22 NOTE — BRIEF OPERATIVE NOTE - OPERATION/FINDINGS
EUA: small anteverted uterus, rectocele, stage II pelvic organ prolapse  uterus and cervix removed intact  ovaries/tubes remain in situ  midurethral sling placed  cystoscopy with no abnormalities, b/l functional UO Small anteverted uterus, rectocele, stage II pelvic organ prolapse.  Grossly normal uterus, ovaries and tubes.  Following suspension procedure and sling cystoscopy performed with bladder intact no suture no mesh and normal bilateral ureteral jets.

## 2020-10-22 NOTE — BRIEF OPERATIVE NOTE - NSICDXBRIEFPROCEDURE_GEN_ALL_CORE_FT
PROCEDURES:  Creation, midurethral sling, female 22-Oct-2020 15:21:14  Sultana, Mitzi  Suspension, ligament, uterosacral 22-Oct-2020 15:21:03  Sultana, Mitzi  Posterior vaginal repair 22-Oct-2020 15:20:13  Sultana, Mitzi  Hysterectomy, vaginal, with cystoscopy 22-Oct-2020 15:20:03  Sultana, Mitzi

## 2020-10-22 NOTE — CHART NOTE - NSCHARTNOTEFT_GEN_A_CORE
GYN POST-OP CHECK    S: Patient seen and evaluated at bedside.  Pt sleeping, easily arousable  Patient reports pain controlled with analgesia. Pt denies N/V, SOB, CP, palpitations, fever/chills.   Not OOB yet.    O:   T(C): 36.5 (10-22-20 @ 15:30), Max: 36.5 (10-22-20 @ 15:30)  HR: 75 (10-22-20 @ 16:00) (63 - 75)  BP: 117/63 (10-22-20 @ 16:00) (110/65 - 126/73)  RR: 16 (10-22-20 @ 16:00) (14 - 17)  SpO2: 98% (10-22-20 @ 16:00) (97% - 100%)  Wt(kg): --  I&O's Summary    22 Oct 2020 07:01  -  22 Oct 2020 16:44  --------------------------------------------------------  IN: 150 mL / OUT: 180 mL / NET: -30 mL    300cc pale clear urine in Ta    Gen: Resting comfortably in bed, NAD  CV: S1S2, RRR  Lungs: CTA B/L  Abd: soft, appropriately tender, (+) BS x 4 quadrants.    Inc: Clean/dry/intact   Ext: SCD's in place and functional, non-tender b/l, no edema        A/P: 64y Female s/p TVH, MUS, USS, posterior repair, Doing well.    Neuro: PO Analgesia PRN    CV: Hemodynamically stable.  Monitor VS. CBC in AM.   Pulm: Saturating well on room air.  Encourage OOB and incentive spirometer use.   GI: Advance to regular diet. Anti-emetics PRN.  : Ta to gravity. TOV in AM  FEN: Electrolytes: LR@75cc/hr.  Heme: DVT ppx w/ SCD's while in bed. Early ambulation, initially with assistance then as tolerated.    ID: Afebrile  Endo: No active issues   Dispo: Discharge to floor from PACU when criteria met. GYN POST-OP CHECK    S: Patient seen and evaluated at bedside.  Pt sleeping, easily arousable  Patient reports pain controlled with analgesia. Pt denies N/V, SOB, CP, palpitations, fever/chills.   Not OOB yet.    O:   T(C): 36.5 (10-22-20 @ 15:30), Max: 36.5 (10-22-20 @ 15:30)  HR: 75 (10-22-20 @ 16:00) (63 - 75)  BP: 117/63 (10-22-20 @ 16:00) (110/65 - 126/73)  RR: 16 (10-22-20 @ 16:00) (14 - 17)  SpO2: 98% (10-22-20 @ 16:00) (97% - 100%)  Wt(kg): --  I&O's Summary    22 Oct 2020 07:01  -  22 Oct 2020 16:44  --------------------------------------------------------  IN: 150 mL / OUT: 180 mL / NET: -30 mL    300cc pale yelloe clear urine in Ta    Gen: Resting comfortably in bed, NAD  CV: S1S2, RRR  Lungs: CTA B/L  Abd: soft, appropriately tender, (+) BS x 4 quadrants.    Inc: Clean/dry/intact   Ext: SCD's in place and functional, non-tender b/l, no edema        A/P: 64y Female s/p TVH, MUS, USS, posterior repair, Doing well.    Neuro: PO Analgesia PRN    CV: Hemodynamically stable.  Monitor VS. CBC in AM.   Pulm: Saturating well on room air.  Encourage OOB and incentive spirometer use.   GI: Advance to regular diet. Anti-emetics PRN.  : Ta to gravity. TOV in AM. Vag packing x 1 in place  FEN: Electrolytes: LR@75cc/hr.  Heme: DVT ppx w/ SCD's while in bed. Early ambulation, initially with assistance then as tolerated.    ID: Afebrile  Endo: No active issues   Dispo: Discharge to floor from PACU when criteria met.

## 2020-10-22 NOTE — BRIEF OPERATIVE NOTE - NSICDXBRIEFPOSTOP_GEN_ALL_CORE_FT
POST-OP DIAGNOSIS:  Prolapse of female pelvic organs 22-Oct-2020 15:22:30  Mitzi Weinberg   POST-OP DIAGNOSIS:  Urinary, incontinence, stress female 22-Oct-2020 19:06:21  Cornelia Rosario  Prolapse of female pelvic organs 22-Oct-2020 15:22:30  Mitzi Weinberg

## 2020-10-22 NOTE — PACU DISCHARGE NOTE - AIRWAY PATENCY:
Anesthesia Start and Stop Event Times     Date Time Event    2/21/2020 0812 Ready for Procedure     0827 Anesthesia Start     0848 Anesthesia Stop        Responsible Staff  02/21/20    Name Role Begin End    Nick Laws M.D. Anesth 0827 0848        Preop Diagnosis (Free Text):  Pre-op Diagnosis     DYSPHAGIA        Preop Diagnosis (Codes):    Post op Diagnosis  Dysphagia      Premium Reason  Non-Premium    Comments:                                                                       
Satisfactory

## 2020-10-23 ENCOUNTER — TRANSCRIPTION ENCOUNTER (OUTPATIENT)
Age: 65
End: 2020-10-23

## 2020-10-23 VITALS
HEART RATE: 70 BPM | RESPIRATION RATE: 18 BRPM | DIASTOLIC BLOOD PRESSURE: 70 MMHG | OXYGEN SATURATION: 99 % | SYSTOLIC BLOOD PRESSURE: 124 MMHG | TEMPERATURE: 99 F

## 2020-10-23 DIAGNOSIS — Z98.890 OTHER SPECIFIED POSTPROCEDURAL STATES: ICD-10-CM

## 2020-10-23 LAB
HCT VFR BLD CALC: 30.9 % — LOW (ref 34.5–45)
HCT VFR BLD CALC: 32.1 % — LOW (ref 34.5–45)
HGB BLD-MCNC: 10.6 G/DL — LOW (ref 11.5–15.5)
HGB BLD-MCNC: 11.1 G/DL — LOW (ref 11.5–15.5)
MCHC RBC-ENTMCNC: 29.1 PG — SIGNIFICANT CHANGE UP (ref 27–34)
MCHC RBC-ENTMCNC: 29.3 PG — SIGNIFICANT CHANGE UP (ref 27–34)
MCHC RBC-ENTMCNC: 34.3 GM/DL — SIGNIFICANT CHANGE UP (ref 32–36)
MCHC RBC-ENTMCNC: 34.6 GM/DL — SIGNIFICANT CHANGE UP (ref 32–36)
MCV RBC AUTO: 84.7 FL — SIGNIFICANT CHANGE UP (ref 80–100)
MCV RBC AUTO: 84.9 FL — SIGNIFICANT CHANGE UP (ref 80–100)
NRBC # BLD: 0 /100 WBCS — SIGNIFICANT CHANGE UP (ref 0–0)
NRBC # BLD: 0 /100 WBCS — SIGNIFICANT CHANGE UP (ref 0–0)
PLATELET # BLD AUTO: 148 K/UL — LOW (ref 150–400)
PLATELET # BLD AUTO: 163 K/UL — SIGNIFICANT CHANGE UP (ref 150–400)
RBC # BLD: 3.64 M/UL — LOW (ref 3.8–5.2)
RBC # BLD: 3.79 M/UL — LOW (ref 3.8–5.2)
RBC # FLD: 13.1 % — SIGNIFICANT CHANGE UP (ref 10.3–14.5)
RBC # FLD: 13.2 % — SIGNIFICANT CHANGE UP (ref 10.3–14.5)
WBC # BLD: 10.03 K/UL — SIGNIFICANT CHANGE UP (ref 3.8–10.5)
WBC # BLD: 10.29 K/UL — SIGNIFICANT CHANGE UP (ref 3.8–10.5)
WBC # FLD AUTO: 10.03 K/UL — SIGNIFICANT CHANGE UP (ref 3.8–10.5)
WBC # FLD AUTO: 10.29 K/UL — SIGNIFICANT CHANGE UP (ref 3.8–10.5)

## 2020-10-23 PROCEDURE — C1771: CPT

## 2020-10-23 PROCEDURE — 85027 COMPLETE CBC AUTOMATED: CPT

## 2020-10-23 PROCEDURE — 88307 TISSUE EXAM BY PATHOLOGIST: CPT

## 2020-10-23 PROCEDURE — 82962 GLUCOSE BLOOD TEST: CPT

## 2020-10-23 PROCEDURE — 88341 IMHCHEM/IMCYTCHM EA ADD ANTB: CPT

## 2020-10-23 RX ORDER — ACETAMINOPHEN 500 MG
3 TABLET ORAL
Qty: 0 | Refills: 0 | DISCHARGE
Start: 2020-10-23

## 2020-10-23 RX ORDER — OXYCODONE HYDROCHLORIDE 5 MG/1
1 TABLET ORAL
Qty: 10 | Refills: 0
Start: 2020-10-23 | End: 2020-10-27

## 2020-10-23 RX ORDER — IBUPROFEN 200 MG
1 TABLET ORAL
Qty: 0 | Refills: 0 | DISCHARGE
Start: 2020-10-23

## 2020-10-23 RX ADMIN — Medication 600 MILLIGRAM(S): at 11:48

## 2020-10-23 RX ADMIN — ONDANSETRON 4 MILLIGRAM(S): 8 TABLET, FILM COATED ORAL at 12:45

## 2020-10-23 RX ADMIN — Medication 975 MILLIGRAM(S): at 17:36

## 2020-10-23 RX ADMIN — Medication 975 MILLIGRAM(S): at 05:28

## 2020-10-23 RX ADMIN — Medication 975 MILLIGRAM(S): at 11:48

## 2020-10-23 NOTE — DISCHARGE NOTE NURSING/CASE MANAGEMENT/SOCIAL WORK - PATIENT PORTAL LINK FT
You can access the FollowMyHealth Patient Portal offered by [facility] by registering at the following website: [email or web address]. By joining [facility]’s FollowMyHealth portal, you will also be able to view your health information using other applications (apps) compatible with our system.

## 2020-10-23 NOTE — DISCHARGE NOTE PROVIDER - HOSPITAL COURSE
64y s/p Total Vaginal Hysterectomy, posterior repair, MUS, USS.    The patient met all appropriate post operative milestones.  The patient was able to void, tolerated a regular diet, and ambulated on her own.  The patient was discharged on POD#1 afebrile, with stable vital signs, and appropriate pain control.

## 2020-10-23 NOTE — DISCHARGE NOTE PROVIDER - CARE PROVIDER_API CALL
Elder Zurita (MD)  Female Pelvic MedReconst Surg; Obstetrics and Gynecology  [address]  [place] [address]  Phone: [phone]  Fax: [phone]  Follow Up Time: 2 weeks

## 2020-10-23 NOTE — CHART NOTE - NSCHARTNOTEFT_GEN_A_CORE
TOV Note    Active Trial of Void performed.   300cc NS instilled into the bladder by gravity.  Ta D/C'd  Pt voided  150  cc   Ta catheter  to remain out.          Pt to have bladder scan PVR performed after next void.  Will manage based on PVR    Kira Gutierres PA-C TOV Note    Vaginal Packing removed, minimal vaginal bleeding noted  Active Trial of Void performed.   300cc NS instilled into the bladder by gravity.  Ta D/C'd  Pt voided  150  cc   Ta catheter  to remain out.          Pt to have bladder scan PVR performed after next void.  Will manage based on PVR    Kira Gutierres PA-C

## 2020-10-23 NOTE — PROGRESS NOTE ADULT - ASSESSMENT
A/P: 64y  s/p Total Vaginal Hysterectomy, posterior repair, MUS, USS.  Patient is stable and doing well.       A/P: 64y  s/p Total Vaginal Hysterectomy, posterior repair, MUS, USS, cystoscopy.  Patient is stable and doing well.

## 2020-10-23 NOTE — PROGRESS NOTE ADULT - SUBJECTIVE AND OBJECTIVE BOX
ABRAHAM ESPINO [id number]    R4 GYN Progress Note    POD#1   HD#2    Patient seen and examined at bedside.  No acute events overnight. No acute complaints.  Pain well controlled.  Patient ambulating around room to bathroom overnight. Has not yet passed flatus.  Ramires is still in place.   Denies CP, SOB, fevers, and chills. Reports tolerating clears overnight however had nausea so did not attempt solid foods. No vomiting.     Vital Signs Last 24 Hours  T(C): 37.1 (10-23-20 @ 05:33), Max: 37.1 (10-23-20 @ 05:33)  HR: 71 (10-23-20 @ 05:33) (63 - 84)  BP: 115/72 (10-23-20 @ 05:33) (110/65 - 151/86)  RR: 18 (10-23-20 @ 05:33) (14 - 18)  SpO2: 98% (10-23-20 @ 05:33) (94% - 100%)    I&O's Summary    22 Oct 2020 07:01  -  23 Oct 2020 07:00  --------------------------------------------------------  IN: 845 mL / OUT: 755 mL / NET: 90 mL        Physical Exam:  General: NAD  CV: RR, S1, S2, no M/R/G  Lungs: CTA b/l  Abdomen: Soft, appropriately tender, mildly distended, +BS  : packing in place, no bleeding, ramires with clear yellow urine   Ext: No pain or swelling     Labs:      MEDICATIONS  (STANDING):  acetaminophen   Tablet .. 975 milliGRAM(s) Oral every 6 hours  ibuprofen  Tablet. 600 milliGRAM(s) Oral every 6 hours  lactated ringers. 1000 milliLiter(s) (75 mL/Hr) IV Continuous <Continuous>    MEDICATIONS  (PRN):  ondansetron Injectable 4 milliGRAM(s) IV Push every 8 hours PRN Nausea and/or Vomiting  oxyCODONE    IR 5 milliGRAM(s) Oral every 4 hours PRN Severe Pain (7 - 10)  simethicone 80 milliGRAM(s) Chew every 6 hours PRN Gas       ABRAHAM ESPINO [id number]    R4 GYN Progress Note    POD#1   HD#2    Patient seen and examined at bedside.  No acute events overnight. No acute complaints.  Pain well controlled.  Patient ambulating around room to bathroom overnight. Has not yet passed flatus.  Ramires is still in place.   Denies CP, SOB, fevers, and chills. Reports tolerating clears overnight however had nausea and small volume emesis, so did not attempt solid foods.     Vital Signs Last 24 Hours  T(C): 37.1 (10-23-20 @ 05:33), Max: 37.1 (10-23-20 @ 05:33)  HR: 71 (10-23-20 @ 05:33) (63 - 84)  BP: 115/72 (10-23-20 @ 05:33) (110/65 - 151/86)  RR: 18 (10-23-20 @ 05:33) (14 - 18)  SpO2: 98% (10-23-20 @ 05:33) (94% - 100%)    I&O's Summary    22 Oct 2020 07:01  -  23 Oct 2020 07:00  --------------------------------------------------------  IN: 845 mL / OUT: 755 mL / NET: 90 mL        Physical Exam:  General: NAD  CV: RR, S1, S2, no M/R/G  Lungs: CTA b/l  Abdomen: Soft, appropriately tender, mildly distended, +BS  : packing in place, no bleeding, ramires with clear yellow urine   Ext: No pain or swelling     Labs:      MEDICATIONS  (STANDING):  acetaminophen   Tablet .. 975 milliGRAM(s) Oral every 6 hours  ibuprofen  Tablet. 600 milliGRAM(s) Oral every 6 hours  lactated ringers. 1000 milliLiter(s) (75 mL/Hr) IV Continuous <Continuous>    MEDICATIONS  (PRN):  ondansetron Injectable 4 milliGRAM(s) IV Push every 8 hours PRN Nausea and/or Vomiting  oxyCODONE    IR 5 milliGRAM(s) Oral every 4 hours PRN Severe Pain (7 - 10)  simethicone 80 milliGRAM(s) Chew every 6 hours PRN Gas

## 2020-10-23 NOTE — PROGRESS NOTE ADULT - PROBLEM SELECTOR PLAN 1
Neuro: PO pain meds.   CV: Hemodynamically stable  Pulm: Saturating well on room air, encourage oob/amb, incentive spirometer   GI:  Continue regular diet  : UOP adequate, for AM trial of void  Heme: SCDs for DVT ppx  Dispo: Continue routine post-op care    Mitzi Weinberg PGY-4

## 2020-10-23 NOTE — DISCHARGE NOTE PROVIDER - NSDCFUADDINST_GEN_ALL_CORE_FT
pain control: take tylenol 957mg every 6 hours and motrin 600 mg every 6 hours around the clock. prescription for oxycodone was sent to your pharmacy.     gas pain: simethicone can be bought over the counter, take 3 times a day as needed    colace: for soft stool, can be bought over the counter, take 2 times a day as needed     follow up with your doctor for a post operative visit in 2 weeks.     pelvic rest for 6 weeks: meaning nothing inside the vagina, no intercourse, no douching, no tampons, no tub baths or swimming for 6 weeks or until cleared by your doctor.      SIGNS OR SYMPTOMS OF INFECTION: Fever, chills, temperature  100.4 or higher or have a foul smelling discharge. Call your doctor for any of these issues

## 2020-10-23 NOTE — DISCHARGE NOTE PROVIDER - NSDCMRMEDTOKEN_GEN_ALL_CORE_FT
acetaminophen 325 mg oral tablet: 3 tab(s) orally every 6 hours  ibuprofen 600 mg oral tablet: 1 tab(s) orally every 6 hours  oxyCODONE 5 mg oral tablet: 1 tab(s) orally every 6 hours MDD:4 tabs  Vitamin D3: 1 tab(s) orally once a day

## 2020-10-23 NOTE — DISCHARGE NOTE PROVIDER - NSDCCPCAREPLAN_GEN_ALL_CORE_FT
PRINCIPAL DISCHARGE DIAGNOSIS  Diagnosis: Postoperative state  Assessment and Plan of Treatment:

## 2020-10-23 NOTE — CHART NOTE - NSCHARTNOTEFT_GEN_A_CORE
OB PA Addendum    Patient voided 350cc at 4pm and her PVR was 60cc  Discussed with urogynecology team and plan is to discharge home with postop instructions and to f/u at scheduled appointment in 2 weeks  Kira Gutierres PA-C

## 2020-11-09 ENCOUNTER — APPOINTMENT (OUTPATIENT)
Dept: UROGYNECOLOGY | Facility: CLINIC | Age: 65
End: 2020-11-09
Payer: COMMERCIAL

## 2020-11-09 PROBLEM — N81.4 UTEROVAGINAL PROLAPSE, UNSPECIFIED: Chronic | Status: ACTIVE | Noted: 2020-10-09
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
## 2020-11-09 PROCEDURE — 99024 POSTOP FOLLOW-UP VISIT: CPT

## 2020-11-09 NOTE — SUBJECTIVE
[FreeTextEntry1] : Recovering well, satisfied with surgery. [FreeTextEntry8] : Tylenol and Aleve [FreeTextEntry7] : Controlled with over the counter medications [FreeTextEntry6] : Normal [FreeTextEntry5] : No frequency dysuria or urgency.  Two episodes of leak with stress small volume [FreeTextEntry4] : Normal [FreeTextEntry3] : Normal [FreeTextEntry2] : Normal

## 2020-11-09 NOTE — DISCUSSION/SUMMARY
[Risks/Benefits discussed. Pt/family verbalizes understanding] : risks and benefits of the procedure were discussed with the patient/family who verbalize understanding [FreeTextEntry1] : Arabella is a 63 yo presenting for two week post op visit status post s/p total vaginal hysterectomy uterosacral ligament suspension anterior/posterior enterocele repair retropubic midurethral sling and cystoscopy on 10/22/20 for prolapse and incontinence.  She is recovering well and meeting all post operative milestones.  She has had two episodes of small volume leakage with stress/movement but no dysuria frequency or urgency, will continue to watch counseled patient that in near post op period normal tissue healing is not completed so expectant management indicated  will reassess at next visit. To follow up in 4 weeks all questions answered.

## 2020-11-09 NOTE — OBJECTIVE
[Post Void Residual ____ ml] : Post Void Residual was [unfilled] ml [Soft and Nontender] : soft and nontender [Good Support] : Good support [Healing well] : healing well [No Masses or Tenderness] : no masses or tenderness [Voiding Trial] : No voiding trial was performed [FreeTextEntry3] : No mesh exposure, some suture visible in vagina, healing well, excellent suspension.

## 2020-12-07 ENCOUNTER — APPOINTMENT (OUTPATIENT)
Dept: UROGYNECOLOGY | Facility: CLINIC | Age: 65
End: 2020-12-07
Payer: COMMERCIAL

## 2020-12-07 VITALS — TEMPERATURE: 96.9 F | DIASTOLIC BLOOD PRESSURE: 80 MMHG | SYSTOLIC BLOOD PRESSURE: 140 MMHG

## 2020-12-07 PROCEDURE — 99024 POSTOP FOLLOW-UP VISIT: CPT | Mod: GC

## 2020-12-07 NOTE — DISCUSSION/SUMMARY
[FreeTextEntry1] : \par 66yo s/p TVH, USLS, APE repair, sling on 10/22/20. She is doing well, postop milestones met. Pathology reviewed and benign. Discussed that she may not see full effects from her sling until 12wk postop. Discussed that if she has an urgency component to her incontinence as well that would be managed medically. RTO in 6 weeks.

## 2020-12-07 NOTE — OBJECTIVE
[Soft and Nontender] : soft and nontender [Clean, Dry, Intact] : Clean, Dry, Intact [Good Support] : Good support [Healing well] : healing well [No Masses or Tenderness] : no masses or tenderness [FreeTextEntry3] : No mesh exposure. Suture visible at apex.

## 2020-12-07 NOTE — SUBJECTIVE
[FreeTextEntry1] : No acute distress [FreeTextEntry8] : None [FreeTextEntry7] : None [FreeTextEntry6] : Normal [FreeTextEntry5] : Normal, no dysuria, incomplete emptying. Occasional incontinence 1-2x/wk, both urgency and stress  [FreeTextEntry4] : Normal [FreeTextEntry3] : Normal [FreeTextEntry2] : Normal

## 2021-02-01 ENCOUNTER — APPOINTMENT (OUTPATIENT)
Dept: UROGYNECOLOGY | Facility: CLINIC | Age: 66
End: 2021-02-01

## 2021-02-05 ENCOUNTER — RESULT REVIEW (OUTPATIENT)
Age: 66
End: 2021-02-05

## 2021-02-15 ENCOUNTER — APPOINTMENT (OUTPATIENT)
Dept: UROGYNECOLOGY | Facility: CLINIC | Age: 66
End: 2021-02-15
Payer: COMMERCIAL

## 2021-02-15 VITALS — TEMPERATURE: 96.3 F | HEART RATE: 70 BPM | SYSTOLIC BLOOD PRESSURE: 140 MMHG | DIASTOLIC BLOOD PRESSURE: 80 MMHG

## 2021-02-15 DIAGNOSIS — N81.89 OTHER FEMALE GENITAL PROLAPSE: ICD-10-CM

## 2021-02-15 DIAGNOSIS — N81.6 RECTOCELE: ICD-10-CM

## 2021-02-15 DIAGNOSIS — N95.2 POSTMENOPAUSAL ATROPHIC VAGINITIS: ICD-10-CM

## 2021-02-15 DIAGNOSIS — Z01.818 ENCOUNTER FOR OTHER PREPROCEDURAL EXAMINATION: ICD-10-CM

## 2021-02-15 DIAGNOSIS — N81.2 INCOMPLETE UTEROVAGINAL PROLAPSE: ICD-10-CM

## 2021-02-15 DIAGNOSIS — N36.41 HYPERMOBILITY OF URETHRA: ICD-10-CM

## 2021-02-15 DIAGNOSIS — N39.3 STRESS INCONTINENCE (FEMALE) (MALE): ICD-10-CM

## 2021-02-15 DIAGNOSIS — R39.198 OTHER DIFFICULTIES WITH MICTURITION: ICD-10-CM

## 2021-02-15 DIAGNOSIS — N81.11 CYSTOCELE, MIDLINE: ICD-10-CM

## 2021-02-15 DIAGNOSIS — Z48.89 ENCOUNTER FOR OTHER SPECIFIED SURGICAL AFTERCARE: ICD-10-CM

## 2021-02-15 PROCEDURE — 99072 ADDL SUPL MATRL&STAF TM PHE: CPT

## 2021-02-15 PROCEDURE — 99213 OFFICE O/P EST LOW 20 MIN: CPT

## 2021-02-15 NOTE — PHYSICAL EXAM
[No Acute Distress] : in no acute distress [Well developed] : well developed [Well Nourished] : ~L well nourished [Normal Mood/Affect] : mood and affect are normal [Soft, Nontender] : the abdomen was soft and nontender [Warm and Dry] : was warm and dry to touch [Vulvar Atrophy] : vulvar atrophy [Normal] : normal external genitalia [Normal Appearance] : general appearance was normal [Atrophy] : atrophy [1] : 1 [Aa ____] : Aa [unfilled] [Ba ____] : Ba [unfilled] [C ____] : C [unfilled] [PB ____] : PB [unfilled] [GH ____] : GH [unfilled] [TVL ____] : TVL  [unfilled] [Ap ____] : Ap [unfilled] [Bp ____] : Bp [unfilled] [Anxiety] : patient is not anxious [FreeTextEntry4] : no mesh exposure seen

## 2021-02-15 NOTE — DISCUSSION/SUMMARY
[FreeTextEntry1] : I reviewed the above findings with the pt.\par Vaginal dryness -  we discussed lubricants vs vaginal estrogen - pt does not want tx.\par IUGA Kegel instructions given\par f/u one year or earlier PRN\par All questions answered

## 2021-02-15 NOTE — HISTORY OF PRESENT ILLNESS
[Unable To Restrain Bowel Movement] : no [x1] : nocturia once nightly [Urinary Frequency] : no [de-identified] : rare with full bladder and strong cough [FreeTextEntry1] : pt s/p TVH, USLS,  APE,  retropubic sling on 10/22/20\par urinary symptoms have improved since last , having less urgency\par pt happy with surgery.\par pt is trying to do Kegels\par pt reports vaginal dryness

## 2023-09-01 ENCOUNTER — RX ONLY (RX ONLY)
Age: 68
End: 2023-09-01

## 2023-09-01 ENCOUNTER — OFFICE (OUTPATIENT)
Dept: URBAN - METROPOLITAN AREA CLINIC 29 | Facility: CLINIC | Age: 68
Setting detail: OPHTHALMOLOGY
End: 2023-09-01
Payer: MEDICARE

## 2023-09-01 DIAGNOSIS — E11.9: ICD-10-CM

## 2023-09-01 DIAGNOSIS — H16.223: ICD-10-CM

## 2023-09-01 DIAGNOSIS — Z96.1: ICD-10-CM

## 2023-09-01 DIAGNOSIS — H26.493: ICD-10-CM

## 2023-09-01 PROCEDURE — 92004 COMPRE OPH EXAM NEW PT 1/>: CPT | Performed by: OPHTHALMOLOGY

## 2023-09-01 PROCEDURE — 92020 GONIOSCOPY: CPT | Performed by: OPHTHALMOLOGY

## 2023-09-01 ASSESSMENT — SPHEQUIV_DERIVED
OD_SPHEQUIV: -0.25
OS_SPHEQUIV: -0.625

## 2023-09-01 ASSESSMENT — TEAR BREAK UP TIME (TBUT)
OS_TBUT: 2+
OD_TBUT: 2+

## 2023-09-01 ASSESSMENT — VISUAL ACUITY
OD_BCVA: 20/25
OS_BCVA: 20/30-2

## 2023-09-01 ASSESSMENT — CONFRONTATIONAL VISUAL FIELD TEST (CVF)
OD_FINDINGS: FULL
OS_FINDINGS: FULL

## 2023-09-01 ASSESSMENT — REFRACTION_AUTOREFRACTION
OS_AXIS: 154
OD_CYLINDER: +1.50
OD_AXIS: 27
OS_SPHERE: -1.00
OS_CYLINDER: +0.75
OD_SPHERE: -1.00

## 2024-10-24 NOTE — PRE-OP CHECKLIST - TO WHOM
dentified pt with two pt identifiers(name and ).    Chief Complaint   Patient presents with    Rectal Pain     Rectal cramping.  Started last month, Comes & goes.  History of hemorrhoids.  Tried OTC cream.        Health Maintenance Due   Topic    Varicella vaccine (1 of 2 - 13+ 2-dose series)    Hepatitis B vaccine (1 of 3 - 19+ 3-dose series)    DTaP/Tdap/Td vaccine (1 - Tdap)    Cervical cancer screen     Flu vaccine (1)    COVID-19 Vaccine ( -  season)    A1C test (Diabetic or Prediabetic)        Wt Readings from Last 3 Encounters:   23 64.4 kg (142 lb)   23 64.4 kg (142 lb)   22 63.3 kg (139 lb 9.6 oz)     Temp Readings from Last 3 Encounters:   23 97.6 °F (36.4 °C) (Temporal)     BP Readings from Last 3 Encounters:   23 118/81   22 114/79     Pulse Readings from Last 3 Encounters:   23 77   22 95           Coordination of Care Questionnaire:  :   1. \"Have you been to the ER, urgent care clinic since your last visit?  Hospitalized since your last visit?\" no    2. \"Have you seen or consulted any other health care providers outside of the [facility] since your last visit?\" no     3. For patients aged 45-75: Has the patient had a colonoscopy / FIT/ Cologuard? no      If the patient is female:    4. For patients aged 40-74: Has the patient had a mammogram within the past 2 years? no      5. For patients aged 21-65: Has the patient had a pap smear? no     3) Do you have an Advance Directive on file? no  Are you interested in receiving information about Advance Directives? no    Patient is accompanied by Daughter I have received verbal consent from Semaj Britton to discuss any/all medical information while they are present in the room.   
  Constitutional:       General: She is not in acute distress.     Appearance: Normal appearance.   Cardiovascular:      Rate and Rhythm: Normal rate and regular rhythm.   Pulmonary:      Effort: Pulmonary effort is normal.      Breath sounds: Normal breath sounds.   Abdominal:      General: Bowel sounds are normal.      Palpations: Abdomen is soft.      Tenderness: There is no abdominal tenderness.      Comments: 2 anal skin tags   Neurological:      Mental Status: She is alert.         Assessment and Plan    1. Other hemorrhoids  -Discussed with patient she is likely experiencing internal hemorrhoids.  -Rx hydrocortisone suppositories 1-2 times daily as needed for pain.   -Patient referred to GI for further evaluation  -     hydrocortisone (ANUSOL-HC) 25 MG suppository; Place 1 suppository rectally 1-2 times daily as needed., Disp-60 suppository, R-0Normal  -     [facility] - Dallas Zamora MD, Gastroenterology, [place]       Return if symptoms worsen or fail to improve.     Diagnosis and plan discussed with patient who verbillized understanding.  
OR RN